# Patient Record
Sex: MALE | NOT HISPANIC OR LATINO | Employment: STUDENT | URBAN - METROPOLITAN AREA
[De-identification: names, ages, dates, MRNs, and addresses within clinical notes are randomized per-mention and may not be internally consistent; named-entity substitution may affect disease eponyms.]

---
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
## 2024-01-18 ENCOUNTER — TELEPHONE (OUTPATIENT)
Dept: FAMILY MEDICINE CLINIC | Facility: CLINIC | Age: 24
End: 2024-01-18

## 2024-01-18 NOTE — LETTER
Ilan Santos  [address] [place], [place] [address] [address]      1/18/2024       Dear Ilan     Records from Insurance indicate that you are a patient of Socorro Novak MD with [facility], [facility], operated by [facility]. Your last office visit was on 10/28/2020.  Please call the office to ensure you remain established and to schedule your Annual Physical at [phone].    If you are seeing a primary care provider other than the one listed above, you can simply call the number on the back of your insurance card to change your primary care physician with your insurance company.    We thank you for choosing [facility] for your healthcare needs.    Sincerely,    Beatrice York MA  Practice Based   [facility]

## 2024-02-22 NOTE — TELEPHONE ENCOUNTER
02/22/24 1:00 PM     The office's request has been received, reviewed, and the patient chart updated. The PCP has successfully been removed with a patient attribution note. This message will now be completed.    Thank you  Terri Guzman

## 2024-03-04 ENCOUNTER — APPOINTMENT (OUTPATIENT)
Dept: RADIOLOGY | Facility: OTHER | Age: 24
End: 2024-03-04
Payer: COMMERCIAL

## 2024-03-04 VITALS
DIASTOLIC BLOOD PRESSURE: 75 MMHG | HEART RATE: 59 BPM | BODY MASS INDEX: 28.29 KG/M2 | HEIGHT: 69 IN | WEIGHT: 191 LBS | SYSTOLIC BLOOD PRESSURE: 123 MMHG

## 2024-03-04 DIAGNOSIS — M25.511 ARTHRALGIA OF RIGHT ACROMIOCLAVICULAR JOINT: Primary | ICD-10-CM

## 2024-03-04 DIAGNOSIS — M25.511 ACUTE PAIN OF RIGHT SHOULDER: ICD-10-CM

## 2024-03-04 PROCEDURE — 99204 OFFICE O/P NEW MOD 45 MIN: CPT | Performed by: ORTHOPAEDIC SURGERY

## 2024-03-04 PROCEDURE — 73030 X-RAY EXAM OF SHOULDER: CPT

## 2024-03-04 NOTE — PROGRESS NOTES
"  Assessment  Diagnoses and all orders for this visit:    Arthralgia of right acromioclavicular joint    Discussion and Plan:    Explained to the patient that his examination and symptoms are consistent with AC joint inflammation of his right shoulder. The pathoanatomy and natural history of this diagnosis was explained to the patient in detail today in the office. He understood and all questions were answered.   He will begin formal PT/HEP for light stretching exercises and modalities as tolerated.   If symptoms persist then an MRI arthrogram would be recommended to further evaluate for any underlying labral pathology.   Follow up in about 6-8 weeks for re evaluation of symptoms.    Subjective:   Patient ID: Ilan Santos is a 23 y.o. male presents with a chief complaint of right shoulder pain.   The pain began a few month(s) ago and is not associated with an acute injury. The patient describes the pain as aching and sharp in intensity,  intermittent in timing, and localizes the pain to the  right AC joint.  The pain is worse with overhead work, overuse, and raising arm over head and relieved by rest, ice, avoiding the painful activities.  The pain is not associated with numbness and tingling.  The pain is not associated with constitutional symptoms. The patient is not awoken at night by the pain.    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           The following portions of the patient's history were reviewed and updated as appropriate: allergies, current medications, past family history, past medical history, past social history, past surgical history and problem list.    Objective:  /75 (BP Location: Right arm, Patient Position: Sitting, Cuff Size: Standard)   Pulse 59   Ht 5' 8.5\" (1.74 m)   Wt 86.6 kg (191 lb)   BMI 28.62 kg/m²       Right Shoulder Exam     Tenderness   The patient is experiencing tenderness in the acromioclavicular joint.    Range of " Motion   The patient has normal right shoulder ROM.    Muscle Strength   Abduction: 5/5   External rotation: 5/5     Tests   Jose test: negative  Impingement: negative    Other   Erythema: absent  Sensation: normal  Pulse: present    Comments:  (+) [place]'s Test            Physical Exam  Constitutional:       General: He is not in acute distress.     Appearance: He is well-developed.   Eyes:      Conjunctiva/sclera: Conjunctivae normal.      Pupils: Pupils are equal, round, and reactive to light.   Cardiovascular:      Rate and Rhythm: Normal rate and regular rhythm.   Pulmonary:      Effort: Pulmonary effort is normal.      Breath sounds: Normal breath sounds.   Abdominal:      General: Bowel sounds are normal.      Palpations: Abdomen is soft.   Musculoskeletal:      Cervical back: Normal range of motion and neck supple.   Skin:     General: Skin is warm and dry.      Findings: No erythema or rash.   Neurological:      Mental Status: He is alert and oriented to person, place, and time.      Deep Tendon Reflexes: Reflexes are normal and symmetric.   Psychiatric:         Behavior: Behavior normal.           I have personally reviewed pertinent films in PACS and my interpretation is as follows.    X Ray Right Shoulder 3/4/24: No acute osseous abnormalities or degenerative changes    Scribe Attestation      I,:  Chip Andres am acting as a scribe while in the presence of the attending physician.:       I,:  Rd Pope MD personally performed the services described in this documentation    as scribed in my presence.: